# Patient Record
Sex: FEMALE | Race: BLACK OR AFRICAN AMERICAN
[De-identification: names, ages, dates, MRNs, and addresses within clinical notes are randomized per-mention and may not be internally consistent; named-entity substitution may affect disease eponyms.]

---

## 2018-05-01 NOTE — CT
ABDOMEN CT WITH CONTRAST

PELVIC CT WITH CONTRAST

5/1/18

 

HISTORY: 

Right lower quadrant pain since Sunday. 

 

COMPARISON:  

None.

 

TECHNIQUE:  

Abdomen and pelvic CT are performed with IV and oral contrast. Coronal reformatted images are submitt
ed for interpretation.

 

FINDINGS:  

 

ABDOMEN CT:

Lung bases are clear. Heart size is normal. No pericardial effusion. The descending thoracic aorta an
d abdominal aorta have a normal caliber. No periaortic fat stranding. 

 

Gallbladder is unremarkable. Portal vein is patent. 

 

Liver, spleen, pancreas and adrenal glands have appropriate enhancement. 

No gastrohepatic, retrocrural or periportal lymphadenopathy.

 

No mesenteric mass, lymphadenopathy, free air or free fluid. There are a few scattered nonspecific ly
mph nodes in the central abdominal mesentery. 

 

There is a simple cyst in the upper pole of the right kidney measuring 3.2 x 4.0 cm. Symmetric enhanc
ement of the kidneys. Bilaterally, no evidence of obstructive uropathy. There is minimal enhancement 
of the right renal pelvis mucosa. Correlate clinically for possible ascending urinary tract infection
. 

 

Gastric mucosa, duodenum and small bowel loops are unremarkable. Ileocecal junction is normal. Normal
 caliber appendix. Scattered fecal material in a nondistended, nondilated colon. No evidence of colon
 obstruction. 

 

PELVIC CT:

Urinary bladder is unremarkable. Uterus and adnexal structures are unremarkable. Small amount of free
 fluid in the pelvis likely physiologic. No mass, lymphadenopathy or free air.

 

No lytic or blastic lesions in the osseous structures.

 

IMPRESSION:  

1.      Normal caliber appendix. 

2.      Mild enhancement of the right renal pelvis. Correlate clinically for ascending urinary tract 
infection. Currently, there is no definite evidence of pyelonephritis. 

 

POS: PPP

## 2019-06-13 ENCOUNTER — HOSPITAL ENCOUNTER (EMERGENCY)
Dept: HOSPITAL 18 - NAV ERS | Age: 24
Discharge: HOME | End: 2019-06-13
Payer: COMMERCIAL

## 2019-06-13 DIAGNOSIS — I10: ICD-10-CM

## 2019-06-13 DIAGNOSIS — L02.221: Primary | ICD-10-CM

## 2019-06-13 PROCEDURE — 87076 CULTURE ANAEROBE IDENT EACH: CPT

## 2019-06-13 PROCEDURE — 90471 IMMUNIZATION ADMIN: CPT

## 2019-06-13 PROCEDURE — 10060 I&D ABSCESS SIMPLE/SINGLE: CPT

## 2019-06-13 PROCEDURE — 87205 SMEAR GRAM STAIN: CPT

## 2019-06-13 PROCEDURE — 87070 CULTURE OTHR SPECIMN AEROBIC: CPT

## 2019-06-13 PROCEDURE — 90715 TDAP VACCINE 7 YRS/> IM: CPT

## 2019-06-19 ENCOUNTER — HOSPITAL ENCOUNTER (EMERGENCY)
Dept: HOSPITAL 18 - NAV ERS | Age: 24
Discharge: HOME | End: 2019-06-19
Payer: COMMERCIAL

## 2019-06-19 DIAGNOSIS — Z48.817: Primary | ICD-10-CM

## 2019-06-19 DIAGNOSIS — I10: ICD-10-CM

## 2019-06-19 PROCEDURE — 99282 EMERGENCY DEPT VISIT SF MDM: CPT

## 2019-10-08 ENCOUNTER — HOSPITAL ENCOUNTER (EMERGENCY)
Dept: HOSPITAL 18 - NAV ERS | Age: 24
Discharge: ANOTHER HOSPITAL | End: 2019-10-08
Payer: COMMERCIAL

## 2019-10-08 ENCOUNTER — HOSPITAL ENCOUNTER (OUTPATIENT)
Dept: HOSPITAL 92 - ERS | Age: 24
Setting detail: OBSERVATION
LOS: 2 days | Discharge: HOME | End: 2019-10-10
Attending: HOSPITALIST | Admitting: HOSPITALIST
Payer: COMMERCIAL

## 2019-10-08 VITALS — BODY MASS INDEX: 47 KG/M2

## 2019-10-08 DIAGNOSIS — R00.0: ICD-10-CM

## 2019-10-08 DIAGNOSIS — L03.311: Primary | ICD-10-CM

## 2019-10-08 DIAGNOSIS — I10: ICD-10-CM

## 2019-10-08 DIAGNOSIS — L02.211: Primary | ICD-10-CM

## 2019-10-08 DIAGNOSIS — E66.01: ICD-10-CM

## 2019-10-08 DIAGNOSIS — L03.311: ICD-10-CM

## 2019-10-08 LAB
ANION GAP SERPL CALC-SCNC: 15 MMOL/L (ref 10–20)
BASOPHILS # BLD AUTO: 0.2 THOU/UL (ref 0–0.2)
BASOPHILS NFR BLD AUTO: 1.4 % (ref 0–1)
BUN SERPL-MCNC: 9 MG/DL (ref 7–18.7)
CALCIUM SERPL-MCNC: 9.5 MG/DL (ref 7.8–10.44)
CHLORIDE SERPL-SCNC: 103 MMOL/L (ref 98–107)
CO2 SERPL-SCNC: 23 MMOL/L (ref 22–29)
CREAT CL PREDICTED SERPL C-G-VRATE: 0 ML/MIN (ref 70–130)
EOSINOPHIL # BLD AUTO: 0.1 THOU/UL (ref 0–0.7)
EOSINOPHIL NFR BLD AUTO: 1 % (ref 0–10)
GLUCOSE SERPL-MCNC: 108 MG/DL (ref 70–105)
HGB BLD-MCNC: 12.1 G/DL (ref 12–16)
LYMPHOCYTES # BLD AUTO: 3.2 THOU/UL (ref 1.2–3.4)
LYMPHOCYTES NFR BLD AUTO: 27.3 % (ref 21–51)
MCH RBC QN AUTO: 23.5 PG (ref 27–31)
MCV RBC AUTO: 78 FL (ref 78–98)
MDIFF COMPLETE?: YES
MICROCYTES BLD QL SMEAR: (no result) (100X)
MONOCYTES # BLD AUTO: 0.9 THOU/UL (ref 0.11–0.59)
MONOCYTES NFR BLD AUTO: 8 % (ref 0–10)
NEUTROPHILS # BLD AUTO: 7.2 THOU/UL (ref 1.4–6.5)
NEUTROPHILS NFR BLD AUTO: 62.4 % (ref 42–75)
PLATELET # BLD AUTO: 358 THOU/UL (ref 130–400)
POTASSIUM SERPL-SCNC: 3.8 MMOL/L (ref 3.5–5.1)
RBC # BLD AUTO: 5.14 MILL/UL (ref 4.2–5.4)
SODIUM SERPL-SCNC: 137 MMOL/L (ref 136–145)
WBC # BLD AUTO: 11.5 THOU/UL (ref 4.8–10.8)

## 2019-10-08 PROCEDURE — 83605 ASSAY OF LACTIC ACID: CPT

## 2019-10-08 PROCEDURE — 80048 BASIC METABOLIC PNL TOTAL CA: CPT

## 2019-10-08 PROCEDURE — 87205 SMEAR GRAM STAIN: CPT

## 2019-10-08 PROCEDURE — 87070 CULTURE OTHR SPECIMN AEROBIC: CPT

## 2019-10-08 PROCEDURE — 96372 THER/PROPH/DIAG INJ SC/IM: CPT

## 2019-10-08 PROCEDURE — 80202 ASSAY OF VANCOMYCIN: CPT

## 2019-10-08 PROCEDURE — 83036 HEMOGLOBIN GLYCOSYLATED A1C: CPT

## 2019-10-08 PROCEDURE — 96365 THER/PROPH/DIAG IV INF INIT: CPT

## 2019-10-08 PROCEDURE — G0378 HOSPITAL OBSERVATION PER HR: HCPCS

## 2019-10-08 PROCEDURE — 36415 COLL VENOUS BLD VENIPUNCTURE: CPT

## 2019-10-08 PROCEDURE — 85025 COMPLETE CBC W/AUTO DIFF WBC: CPT

## 2019-10-08 PROCEDURE — 96366 THER/PROPH/DIAG IV INF ADDON: CPT

## 2019-10-08 PROCEDURE — 96376 TX/PRO/DX INJ SAME DRUG ADON: CPT

## 2019-10-08 PROCEDURE — 96367 TX/PROPH/DG ADDL SEQ IV INF: CPT

## 2019-10-08 PROCEDURE — 10060 I&D ABSCESS SIMPLE/SINGLE: CPT

## 2019-10-08 PROCEDURE — 87040 BLOOD CULTURE FOR BACTERIA: CPT

## 2019-10-09 LAB
BASOPHILS # BLD AUTO: 0.1 THOU/UL (ref 0–0.2)
BASOPHILS NFR BLD AUTO: 0.5 % (ref 0–1)
EOSINOPHIL # BLD AUTO: 0.2 THOU/UL (ref 0–0.7)
EOSINOPHIL NFR BLD AUTO: 2.1 % (ref 0–10)
HGB BLD-MCNC: 10.7 G/DL (ref 12–16)
LYMPHOCYTES # BLD: 3.7 THOU/UL (ref 1.2–3.4)
LYMPHOCYTES NFR BLD AUTO: 34.7 % (ref 21–51)
MCH RBC QN AUTO: 25.7 PG (ref 27–31)
MCV RBC AUTO: 78.4 FL (ref 78–98)
MONOCYTES # BLD AUTO: 1.2 THOU/UL (ref 0.11–0.59)
MONOCYTES NFR BLD AUTO: 11 % (ref 0–10)
NEUTROPHILS # BLD AUTO: 5.4 THOU/UL (ref 1.4–6.5)
NEUTROPHILS NFR BLD AUTO: 51.7 % (ref 42–75)
PLATELET # BLD AUTO: 307 THOU/UL (ref 130–400)
RBC # BLD AUTO: 4.18 MILL/UL (ref 4.2–5.4)
WBC # BLD AUTO: 10.5 THOU/UL (ref 4.8–10.8)

## 2019-10-09 RX ADMIN — VANCOMYCIN HYDROCHLORIDE SCH MLS: 1 INJECTION, SOLUTION INTRAVENOUS at 14:45

## 2019-10-09 RX ADMIN — CEFTRIAXONE SCH MLS: 1 INJECTION, POWDER, FOR SOLUTION INTRAMUSCULAR; INTRAVENOUS at 08:39

## 2019-10-09 RX ADMIN — Medication SCH ML: at 14:46

## 2019-10-09 RX ADMIN — Medication SCH ML: at 08:40

## 2019-10-09 NOTE — HP
PRIMARY CARE PHYSICIAN:  Melody Che MD



CHIEF COMPLAINT:  "I have a sore on my abdomen."



HISTORY OF PRESENT ILLNESS:  Ms. Arias is a pleasant 24-year-old female, who has a

history of hypertension.  She says that she occasionally will get abscesses on her

body and she says about 4 days ago she noticed a small little knot on her belly that

got progressively larger.  She says the pain got to be intolerable and so she went

to the ER and [place].  There they diagnosed her with an abdominal wall cellulitis

and abscess.  It was lanced, but then she developed some fever and was complaining

of increased pain, and for this reason, they sent her to our facility for

observation.  She denies having any type of trauma, but says she gets these "all the

time."  No nausea.  No vomiting.  She denies any fever at home and there were no

inciting events. 



REVIEW OF SYSTEMS:  CONSTITUTIONAL:  There has been no fevers or chills, no night

sweats, no weight loss. 

HEENT:  No headaches.  No dizziness.  No visual changes.  No sore throat,

rhinorrhea, or neck pain.  No adenopathy. 

PULMONARY:  No hemoptysis.  No cough.  No wheezing. 

CARDIOVASCULAR:  She denies any chest pain.  No shortness of breath.  No PND.  No

orthopnea. 

GASTROINTESTINAL:  No abdominal pain.  No nausea.  No vomiting.  No diarrhea. 

GENITOURINARY:  No urinary frequency or hematuria.  No hesitancy. 

MUSCULOSKELETAL:  As in the history of present illness. 

NEUROLOGIC:  No focal weakness or numbness.  No seizures.  No ataxia. 

SKIN AND INTEGUMENT:  As in the history of present illness and some redness and

swelling over the lower abdomen. 



PAST MEDICAL HISTORY:  Significant for hypertension.



PAST SURGICAL HISTORY:  She has had her adenoids removed, tubes in her ears,

tonsillectomy, and rods in both legs, she said for because she was knock-kneed. 



ALLERGIES:  NO KNOWN DRUG ALLERGIES.



FAMILY HISTORY:  Significant for hypertension and diabetes.



SOCIAL HISTORY:  She is a nonsmoker and nondrinker.  She is single.  No children.



CURRENT MEDICATIONS:  Prior to admission none.



PHYSICAL EXAMINATION:

GENERAL:  She is alert and oriented.  She appears to be in no acute distress.  She

is well developed and well nourished. 

VITAL SIGNS:  Blood pressure is 120/56, heart rate is 78, respiratory rate of 16,

temperature is 98.7. 

HEENT:  Pupils are equal, round, and reactive.  Extraocular muscles are intact.  Her

sclerae are anicteric.  Throat, no erythema, no exudates. 

NECK:  No adenopathy.  No bruits. 

LUNGS:  Clear to auscultation.  There is no wheezing, no rales, no rhonchi. 

CARDIOVASCULAR:  She has a normal S1 and S2.  There is no S3 or S4.  No murmurs,

clicks, or rubs. 

ABDOMEN:  Soft.  She does have an area of induration in the right lower quadrant and

the area has been lanced.  There is some serosanguineous drainage.  There is a wick

or packing in place, but there is no evidence on physical of any deeper induration

or abscess formation in the area from where it had been marked by the ER physician.

The redness has receded significantly from that. 

EXTREMITIES:  On her lower extremities, there is no clubbing or cyanosis.  No edema.

 No joint effusions. 

NEUROLOGIC:  Nonfocal.



LABORATORY DATA:  Her A1c is 5.9.  Her CBC, white blood cell count is 10.5,

hemoglobin is 10.7, hematocrit is 32.8, and platelet count is 307. 



ASSESSMENT:  This is a pleasant 24-year-old female, who presents with an abdominal

abscess.  She has a significant pannus.  Therefore, I think it is reasonable to

monitor overnight in the hospital so that we can ensure continued improvement.  The

plan will be to continue IV vancomycin and Rocephin, reassess her tomorrow.

Continue medications for symptom relief, and hopefully, she can be discharged home

tomorrow on an oral antibiotic with a close outpatient followup. 





Job ID:  [id number]

## 2019-10-10 VITALS — TEMPERATURE: 98.6 F

## 2019-10-10 VITALS — SYSTOLIC BLOOD PRESSURE: 139 MMHG | DIASTOLIC BLOOD PRESSURE: 63 MMHG

## 2019-10-10 LAB
BASOPHILS # BLD AUTO: 0.1 THOU/UL (ref 0–0.2)
BASOPHILS NFR BLD AUTO: 0.6 % (ref 0–1)
EOSINOPHIL # BLD AUTO: 0.2 THOU/UL (ref 0–0.7)
EOSINOPHIL NFR BLD AUTO: 2.3 % (ref 0–10)
HGB BLD-MCNC: 10.9 G/DL (ref 12–16)
LYMPHOCYTES # BLD: 3.5 THOU/UL (ref 1.2–3.4)
LYMPHOCYTES NFR BLD AUTO: 39.7 % (ref 21–51)
MCH RBC QN AUTO: 25.6 PG (ref 27–31)
MCV RBC AUTO: 79.1 FL (ref 78–98)
MONOCYTES # BLD AUTO: 0.8 THOU/UL (ref 0.11–0.59)
MONOCYTES NFR BLD AUTO: 8.7 % (ref 0–10)
NEUTROPHILS # BLD AUTO: 4.3 THOU/UL (ref 1.4–6.5)
NEUTROPHILS NFR BLD AUTO: 48.7 % (ref 42–75)
PLATELET # BLD AUTO: 320 THOU/UL (ref 130–400)
RBC # BLD AUTO: 4.24 MILL/UL (ref 4.2–5.4)
VANCOMYCIN TROUGH SERPL-MCNC: 4.1 UG/ML
WBC # BLD AUTO: 8.9 THOU/UL (ref 4.8–10.8)

## 2019-10-10 RX ADMIN — VANCOMYCIN HYDROCHLORIDE SCH MLS: 1 INJECTION, SOLUTION INTRAVENOUS at 02:07

## 2019-10-10 RX ADMIN — Medication SCH ML: at 07:10

## 2019-10-10 RX ADMIN — CEFTRIAXONE SCH MLS: 1 INJECTION, POWDER, FOR SOLUTION INTRAMUSCULAR; INTRAVENOUS at 08:07

## 2019-10-10 RX ADMIN — Medication SCH ML: at 08:08

## 2019-10-10 NOTE — PDOC.HOSPP
- Subjective


Encounter Date: 10/10/19


Encounter Time: 14:00


Subjective: 





Ms. Arias was seen today in follow-up of cellulitis of the abdominal wall. She 

does not have any complaints.





- Objective


Vital Signs & Weight: 


 Vital Signs (12 hours)











  Temp Pulse Resp BP Pulse Ox


 


 10/10/19 11:25  98.6 F  59 L  16  139/63  100


 


 10/10/19 08:39  98.6 F  57 L  16  132/61  99


 


 10/10/19 04:00  98.4 F  65  16  132/59 L  98








 Weight











Weight                         376 lb














I&O: 


 











 10/09/19 10/10/19 10/11/19





 06:59 06:59 06:59


 


Intake Total 680 600 


 


Balance 680 600 











Result Diagrams: 


 10/10/19 03:45








Hospitalist ROS





- Medication


Medications: 


Active Medications











Generic Name Dose Route Start Last Admin





  Trade Name Freq  PRN Reason Stop Dose Admin


 


Enoxaparin Sodium  40 mg  10/09/19 09:00  10/10/19 08:04





  Lovenox  SC   40 mg





  0900 JONA   Administration





     





     





     





     


 


Ceftriaxone Sodium 1 gm/  100 mls @ 200 mls/hr  10/09/19 09:00  10/10/19 08:07





  Sodium Chloride  IVPB   100 mls





  Q24HR JONA   Administration





     





     





     





     


 


Sodium Chloride  10 ml  10/09/19 09:00  10/10/19 08:08





  Flush - Normal Saline  IVF   10 ml





  Q12HR JONA   Administration





     





     





     





     














- Exam


Eye: PERRL, anicteric sclera


Heart: RRR, no murmur, no gallops, no rubs, normal peripheral pulses


Respiratory: CTAB, no wheezes, no rales, no ronchi, normal chest expansion, no 

tachypnea, normal percussion


Gastrointestinal: soft (+ abdominal wall induration has decreased, no erythema 

today, + serous drainage from the wound), non-tender, normal bowel sounds, no 

palpable masses, no hepatomegaly


Extremities: no cyanosis, no edema





Hosp A/P


(1) Cellulitis of abdominal wall


Code(s): L03.311 - CELLULITIS OF ABDOMINAL WALL   Status: Acute   





(2) Hypertension


Code(s): I10 - ESSENTIAL (PRIMARY) HYPERTENSION   Status: Chronic   





(3) Obesity, morbid, BMI 40.0-49.9


Code(s): E66.01 - MORBID (SEVERE) OBESITY DUE TO EXCESS CALORIES   Status: 

Chronic   





- Plan





* Abdominal wall cellulitis- she has been evaluated by wound care


* She is stable for discharge home, and close follow-up

## 2020-03-21 ENCOUNTER — HOSPITAL ENCOUNTER (EMERGENCY)
Dept: HOSPITAL 18 - NAV ERS | Age: 25
LOS: 1 days | Discharge: HOME | End: 2020-03-22
Payer: COMMERCIAL

## 2020-03-21 DIAGNOSIS — R30.0: Primary | ICD-10-CM

## 2020-03-21 DIAGNOSIS — E66.9: ICD-10-CM

## 2020-03-21 DIAGNOSIS — I10: ICD-10-CM

## 2020-03-21 LAB
BACTERIA UR QL AUTO: (no result) HPF
RBC UR QL AUTO: (no result) HPF (ref 0–3)
WBC UR QL AUTO: (no result) HPF (ref 0–3)

## 2020-03-21 PROCEDURE — 81015 MICROSCOPIC EXAM OF URINE: CPT

## 2020-03-21 PROCEDURE — 87086 URINE CULTURE/COLONY COUNT: CPT

## 2020-03-21 PROCEDURE — 99283 EMERGENCY DEPT VISIT LOW MDM: CPT

## 2020-03-21 PROCEDURE — 81003 URINALYSIS AUTO W/O SCOPE: CPT

## 2021-01-31 ENCOUNTER — HOSPITAL ENCOUNTER (EMERGENCY)
Dept: HOSPITAL 18 - NAV ERS | Age: 26
Discharge: HOME | End: 2021-01-31
Payer: COMMERCIAL

## 2021-01-31 DIAGNOSIS — E66.9: ICD-10-CM

## 2021-01-31 DIAGNOSIS — N39.0: Primary | ICD-10-CM

## 2021-01-31 DIAGNOSIS — I10: ICD-10-CM

## 2021-01-31 LAB
BACTERIA UR QL AUTO: (no result) HPF
PREGU CONTROL BACKGROUND?: (no result)
PREGU CONTROL BAR APPEAR?: YES
RBC UR QL AUTO: (no result) HPF (ref 0–3)
SP GR UR STRIP: 1.02 (ref 1–1.03)

## 2021-01-31 PROCEDURE — 87086 URINE CULTURE/COLONY COUNT: CPT

## 2021-01-31 PROCEDURE — 87077 CULTURE AEROBIC IDENTIFY: CPT

## 2021-01-31 PROCEDURE — 81003 URINALYSIS AUTO W/O SCOPE: CPT

## 2021-01-31 PROCEDURE — 99283 EMERGENCY DEPT VISIT LOW MDM: CPT

## 2021-01-31 PROCEDURE — 87186 SC STD MICRODIL/AGAR DIL: CPT

## 2021-01-31 PROCEDURE — 81025 URINE PREGNANCY TEST: CPT

## 2021-01-31 PROCEDURE — 81015 MICROSCOPIC EXAM OF URINE: CPT

## 2021-08-01 ENCOUNTER — HOSPITAL ENCOUNTER (EMERGENCY)
Dept: HOSPITAL 18 - NAV ERS | Age: 26
Discharge: HOME | End: 2021-08-01
Payer: COMMERCIAL

## 2021-08-01 DIAGNOSIS — E66.9: ICD-10-CM

## 2021-08-01 DIAGNOSIS — U07.1: Primary | ICD-10-CM

## 2021-08-01 DIAGNOSIS — I10: ICD-10-CM

## 2021-08-01 PROCEDURE — 71045 X-RAY EXAM CHEST 1 VIEW: CPT
